# Patient Record
Sex: MALE | Race: BLACK OR AFRICAN AMERICAN | Employment: FULL TIME | URBAN - METROPOLITAN AREA
[De-identification: names, ages, dates, MRNs, and addresses within clinical notes are randomized per-mention and may not be internally consistent; named-entity substitution may affect disease eponyms.]

---

## 2019-08-21 ENCOUNTER — HOSPITAL ENCOUNTER (EMERGENCY)
Age: 49
Discharge: HOME | End: 2019-08-21
Attending: EMERGENCY MEDICINE
Payer: SELF-PAY

## 2019-08-21 ENCOUNTER — APPOINTMENT (OUTPATIENT)
Dept: GENERAL RADIOLOGY | Age: 49
End: 2019-08-21
Attending: EMERGENCY MEDICINE
Payer: SELF-PAY

## 2019-08-21 ENCOUNTER — APPOINTMENT (OUTPATIENT)
Dept: CT IMAGING | Age: 49
End: 2019-08-21
Attending: EMERGENCY MEDICINE
Payer: SELF-PAY

## 2019-08-21 VITALS
TEMPERATURE: 98.2 F | HEART RATE: 79 BPM | OXYGEN SATURATION: 99 % | HEIGHT: 73 IN | WEIGHT: 168 LBS | DIASTOLIC BLOOD PRESSURE: 81 MMHG | BODY MASS INDEX: 22.26 KG/M2 | RESPIRATION RATE: 18 BRPM | SYSTOLIC BLOOD PRESSURE: 134 MMHG

## 2019-08-21 DIAGNOSIS — T14.8XXA MUSCLE STRAIN: ICD-10-CM

## 2019-08-21 DIAGNOSIS — R51.9 NONINTRACTABLE HEADACHE, UNSPECIFIED CHRONICITY PATTERN, UNSPECIFIED HEADACHE TYPE: Primary | ICD-10-CM

## 2019-08-21 LAB
ALBUMIN SERPL-MCNC: 4 G/DL (ref 3.5–5)
ALBUMIN/GLOB SERPL: 1.1 {RATIO} (ref 1.2–3.5)
ALP SERPL-CCNC: 110 U/L (ref 50–136)
ALT SERPL-CCNC: 18 U/L (ref 12–65)
ANION GAP SERPL CALC-SCNC: 5 MMOL/L (ref 7–16)
AST SERPL-CCNC: 12 U/L (ref 15–37)
ATRIAL RATE: 88 BPM
BASOPHILS # BLD: 0 K/UL (ref 0–0.2)
BASOPHILS NFR BLD: 0 % (ref 0–2)
BILIRUB SERPL-MCNC: 0.2 MG/DL (ref 0.2–1.1)
BUN SERPL-MCNC: 7 MG/DL (ref 6–23)
CALCIUM SERPL-MCNC: 8.8 MG/DL (ref 8.3–10.4)
CALCULATED P AXIS, ECG09: 84 DEGREES
CALCULATED R AXIS, ECG10: 71 DEGREES
CALCULATED T AXIS, ECG11: 72 DEGREES
CHLORIDE SERPL-SCNC: 108 MMOL/L (ref 98–107)
CO2 SERPL-SCNC: 29 MMOL/L (ref 21–32)
CREAT SERPL-MCNC: 0.96 MG/DL (ref 0.8–1.5)
DIAGNOSIS, 93000: NORMAL
DIFFERENTIAL METHOD BLD: ABNORMAL
EOSINOPHIL # BLD: 0.2 K/UL (ref 0–0.8)
EOSINOPHIL NFR BLD: 1 % (ref 0.5–7.8)
ERYTHROCYTE [DISTWIDTH] IN BLOOD BY AUTOMATED COUNT: 13.2 % (ref 11.9–14.6)
GLOBULIN SER CALC-MCNC: 3.8 G/DL (ref 2.3–3.5)
GLUCOSE SERPL-MCNC: 108 MG/DL (ref 65–100)
HCT VFR BLD AUTO: 43.5 % (ref 41.1–50.3)
HGB BLD-MCNC: 14.3 G/DL (ref 13.6–17.2)
IMM GRANULOCYTES # BLD AUTO: 0 K/UL (ref 0–0.5)
IMM GRANULOCYTES NFR BLD AUTO: 0 % (ref 0–5)
LYMPHOCYTES # BLD: 3.8 K/UL (ref 0.5–4.6)
LYMPHOCYTES NFR BLD: 33 % (ref 13–44)
MCH RBC QN AUTO: 30.4 PG (ref 26.1–32.9)
MCHC RBC AUTO-ENTMCNC: 32.9 G/DL (ref 31.4–35)
MCV RBC AUTO: 92.6 FL (ref 79.6–97.8)
MONOCYTES # BLD: 0.7 K/UL (ref 0.1–1.3)
MONOCYTES NFR BLD: 6 % (ref 4–12)
NEUTS SEG # BLD: 6.8 K/UL (ref 1.7–8.2)
NEUTS SEG NFR BLD: 59 % (ref 43–78)
NRBC # BLD: 0 K/UL (ref 0–0.2)
P-R INTERVAL, ECG05: 134 MS
PLATELET # BLD AUTO: 298 K/UL (ref 150–450)
PMV BLD AUTO: 10.6 FL (ref 9.4–12.3)
POTASSIUM SERPL-SCNC: 3.5 MMOL/L (ref 3.5–5.1)
PROT SERPL-MCNC: 7.8 G/DL (ref 6.3–8.2)
Q-T INTERVAL, ECG07: 362 MS
QRS DURATION, ECG06: 76 MS
QTC CALCULATION (BEZET), ECG08: 438 MS
RBC # BLD AUTO: 4.7 M/UL (ref 4.23–5.6)
SODIUM SERPL-SCNC: 142 MMOL/L (ref 136–145)
TROPONIN I SERPL-MCNC: <0.02 NG/ML (ref 0.02–0.05)
VENTRICULAR RATE, ECG03: 88 BPM
WBC # BLD AUTO: 11.5 K/UL (ref 4.3–11.1)

## 2019-08-21 PROCEDURE — 85025 COMPLETE CBC W/AUTO DIFF WBC: CPT

## 2019-08-21 PROCEDURE — 99284 EMERGENCY DEPT VISIT MOD MDM: CPT | Performed by: EMERGENCY MEDICINE

## 2019-08-21 PROCEDURE — 96374 THER/PROPH/DIAG INJ IV PUSH: CPT | Performed by: EMERGENCY MEDICINE

## 2019-08-21 PROCEDURE — 74011000250 HC RX REV CODE- 250: Performed by: EMERGENCY MEDICINE

## 2019-08-21 PROCEDURE — 71046 X-RAY EXAM CHEST 2 VIEWS: CPT

## 2019-08-21 PROCEDURE — 74011250636 HC RX REV CODE- 250/636: Performed by: EMERGENCY MEDICINE

## 2019-08-21 PROCEDURE — 70450 CT HEAD/BRAIN W/O DYE: CPT

## 2019-08-21 PROCEDURE — 93005 ELECTROCARDIOGRAM TRACING: CPT | Performed by: EMERGENCY MEDICINE

## 2019-08-21 PROCEDURE — 72040 X-RAY EXAM NECK SPINE 2-3 VW: CPT

## 2019-08-21 PROCEDURE — 80053 COMPREHEN METABOLIC PANEL: CPT

## 2019-08-21 PROCEDURE — 84484 ASSAY OF TROPONIN QUANT: CPT

## 2019-08-21 RX ORDER — LIDOCAINE 50 MG/G
PATCH TOPICAL
Qty: 7 EACH | Refills: 0 | Status: SHIPPED | OUTPATIENT
Start: 2019-08-21

## 2019-08-21 RX ORDER — LIDOCAINE 4 G/100G
1 PATCH TOPICAL
Status: DISCONTINUED | OUTPATIENT
Start: 2019-08-21 | End: 2019-08-21 | Stop reason: HOSPADM

## 2019-08-21 RX ORDER — KETOROLAC TROMETHAMINE 30 MG/ML
15 INJECTION, SOLUTION INTRAMUSCULAR; INTRAVENOUS
Status: COMPLETED | OUTPATIENT
Start: 2019-08-21 | End: 2019-08-21

## 2019-08-21 RX ORDER — NAPROXEN 500 MG/1
500 TABLET ORAL 2 TIMES DAILY WITH MEALS
Qty: 20 TAB | Refills: 0 | Status: SHIPPED | OUTPATIENT
Start: 2019-08-21 | End: 2019-08-31

## 2019-08-21 RX ADMIN — KETOROLAC TROMETHAMINE 15 MG: 30 INJECTION, SOLUTION INTRAMUSCULAR at 16:30

## 2019-08-21 NOTE — DISCHARGE INSTRUCTIONS
Ice your neck and back -- 20-30 minutes 2-3 times a day for 2-3 days     Medications as directed     XR SPINE CERV PA LAT ODONT 3 V MAX   Final Result   IMPRESSION: No acute osseous abnormality. CT HEAD WO CONT   Final Result   IMPRESSION: No acute intracranial abnormality. CPT code 29676            XR CHEST PA LAT   Final Result   Impression:        No acute cardiopulmonary process.       CPT code(s) 44614

## 2019-08-21 NOTE — ED NOTES
I have reviewed discharge instructions with the patient and spouse. The patient and spouse verbalized understanding. Patient left ED via Discharge Method: ambulatory to Home with family    Opportunity for questions and clarification provided. Patient given 2 scripts. To continue your aftercare when you leave the hospital, you may receive an automated call from our care team to check in on how you are doing. This is a free service and part of our promise to provide the best care and service to meet your aftercare needs.  If you have questions, or wish to unsubscribe from this service please call [phone]. Thank you for Choosing our [facility] Emergency Department.
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
## 2020-05-14 NOTE — LETTER
[facility] EMERGENCY DEPT 
ONE  [address] [facility] 
[phone] Work/School Note Date: 8/21/2019 To Whom It May concern: 
 
Obdulia Hernandez was seen and treated today in the emergency room by the following provider(s): 
Attending Provider: Leopoldo Deep, MD.   
 
Obdulia Hernandez return to work on 8/24 Sincerely, 
 
 
 
 
Castillo Nicholson MD 
 
 
 
 Detail Level: Zone Detail Level: Detailed

## 2020-06-03 ENCOUNTER — HOSPITAL ENCOUNTER (EMERGENCY)
Age: 50
Discharge: HOME | End: 2020-06-03
Attending: EMERGENCY MEDICINE
Payer: COMMERCIAL

## 2020-06-03 VITALS
HEART RATE: 110 BPM | TEMPERATURE: 98.3 F | DIASTOLIC BLOOD PRESSURE: 78 MMHG | SYSTOLIC BLOOD PRESSURE: 117 MMHG | HEIGHT: 73 IN | BODY MASS INDEX: 22.13 KG/M2 | WEIGHT: 167 LBS | OXYGEN SATURATION: 98 % | RESPIRATION RATE: 18 BRPM

## 2020-06-03 DIAGNOSIS — T78.40XA ALLERGIC REACTION, INITIAL ENCOUNTER: Primary | ICD-10-CM

## 2020-06-03 PROCEDURE — 99283 EMERGENCY DEPT VISIT LOW MDM: CPT

## 2020-06-03 PROCEDURE — 74011636637 HC RX REV CODE- 636/637: Performed by: EMERGENCY MEDICINE

## 2020-06-03 PROCEDURE — 74011250637 HC RX REV CODE- 250/637: Performed by: EMERGENCY MEDICINE

## 2020-06-03 RX ORDER — PREDNISONE 20 MG/1
40 TABLET ORAL DAILY
Qty: 10 TAB | Refills: 0 | Status: SHIPPED | OUTPATIENT
Start: 2020-06-03 | End: 2020-06-08

## 2020-06-03 RX ORDER — DIPHENHYDRAMINE HCL 50 MG
50 CAPSULE ORAL
Status: COMPLETED | OUTPATIENT
Start: 2020-06-03 | End: 2020-06-03

## 2020-06-03 RX ADMIN — DIPHENHYDRAMINE HYDROCHLORIDE 50 MG: 50 CAPSULE ORAL at 22:19

## 2020-06-03 RX ADMIN — PREDNISONE 60 MG: 10 TABLET ORAL at 22:19

## 2020-06-03 NOTE — LETTER
[address] [facility] EMERGENCY DEPT 
[address] [facility] 
[phone] Work/School Note Date: 6/3/2020 To Whom It May concern: 
 
Hyacinth Oviedo was seen and treated today in the emergency room by the following provider(s): 
Attending Provider: Lore Jeong MD.   
 
Hyacinth Oviedo may return to work on 6/4/20.  
 
Sincerely, 
 
 
 
 
Bishop Mcnultych, RN

## 2020-06-04 NOTE — DISCHARGE INSTRUCTIONS
Rest.  Cool compresses. Elevate. No work tonight. Recheck tomorrow if not improving or if your situation worsens. Recheck sooner for fever or other worrisome symptoms. Patient Education        Allergic Reaction: Care Instructions  Your Care Instructions     An allergic reaction is an excessive response from your immune system to a medicine, chemical, food, insect bite, or other substance. A reaction can range from mild to life-threatening. Some people have a mild rash, hives, and itching or stomach cramps. In severe reactions, swelling of your tongue and throat can close up your airway so that you cannot breathe. Follow-up care is a key part of your treatment and safety. Be sure to make and go to all appointments, and call your doctor if you are having problems. It's also a good idea to know your test results and keep a list of the medicines you take. How can you care for yourself at home? · If you know what caused your allergic reaction, be sure to avoid it. Your allergy may become more severe each time you have a reaction. · Take an over-the-counter antihistamine, such as cetirizine (Zyrtec) or loratadine (Claritin), to treat mild symptoms. Read and follow directions on the label. Some antihistamines can make you feel sleepy. Do not give antihistamines to a child unless you have checked with your doctor first. Mild symptoms include sneezing or an itchy or runny nose; an itchy mouth; a few hives or mild itching; and mild nausea or stomach discomfort. · Do not scratch hives or a rash. Put a cold, moist towel on them or take cool baths to relieve itching. Put ice packs on hives, swelling, or insect stings for 10 to 15 minutes at a time. Put a thin cloth between the ice pack and your skin. Do not take hot baths or showers. They will make the itching worse. · Your doctor may prescribe a shot of epinephrine to carry with you in case you have a severe reaction.  Learn how to give yourself the shot and keep it with you at all times. Make sure it is not . · Go to the emergency room every time you have a severe reaction, even if you have used your shot of epinephrine and are feeling better. Symptoms can come back after a shot. · Wear medical alert jewelry that lists your allergies. You can buy this at most drugstores. · If your child has a severe allergy, make sure that his or her teachers, babysitters, coaches, and other caregivers know about the allergy. They should have an epinephrine shot, know how and when to give it, and have a plan to take your child to the hospital.  When should you call for help? Give an epinephrine shot if:  · You think you are having a severe allergic reaction. · You have symptoms in more than one body area, such as mild nausea and an itchy mouth. After giving an epinephrine shot call 911, even if you feel better. [id number] if:  · You have symptoms of a severe allergic reaction. These may include:  ? Sudden raised, red areas (hives) all over your body. ? Swelling of the throat, mouth, lips, or tongue. ? Trouble breathing. ? Passing out (losing consciousness). Or you may feel very lightheaded or suddenly feel weak, confused, or restless. · You have been given an epinephrine shot, even if you feel better. Call your doctor now or seek immediate medical care if:  · You have symptoms of an allergic reaction, such as:  ? A rash or hives (raised, red areas on the skin). ? Itching. ? Swelling. ? Belly pain, nausea, or vomiting. Watch closely for changes in your health, and be sure to contact your doctor if:  · You do not get better as expected. Where can you learn more? Go to http://sherman-jose.info/  Enter M957 in the search box to learn more about \"Allergic Reaction: Care Instructions. \"  Current as of: 2019               Content Version: 12.5  © 4597-0310 [employer].    Care instructions adapted under license by [employer] (which disclaims liability or warranty for this information). If you have questions about a medical condition or this instruction, always ask your healthcare professional. [address] any warranty or liability for your use of this information.

## 2020-06-04 NOTE — ED PROVIDER NOTES
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 Left work at 3 AM this morning. There were no particular injuries or any other incidents. Not around any chemicals. When he went to bed at 7 he noticed some slight itching he is right hand. As he slept during the day increasing itching and swelling of the hand. Slight burning sensation. But no overt pain. No fever chills. No recent trauma. Does not know of any insect or spider bites. Past medical history is negative. No history of gout    The history is provided by the patient. Hand Swelling    This is a new problem. The current episode started 6 to 12 hours ago. The problem occurs constantly. The problem has been gradually worsening. The patient is experiencing no pain. Associated symptoms include stiffness, tingling and itching. Pertinent negatives include no numbness. He has tried nothing for the symptoms. There has been no history of extremity trauma.         Past Medical History:   Diagnosis Date    Gonococcal urethritis 4/2016; 6/2016    HSV-2 seropositive     Tobacco abuse        Past Surgical History:   Procedure Laterality Date    HX ORTHOPAEDIC      right index finger         Family History:   Problem Relation Age of Onset    Cancer Mother         brain tumor    Cancer Father         uncertain    Cancer Brother         brain tumor, lung       Social History     Socioeconomic History    Marital status: SINGLE     Spouse name: Not on file    Number of children: Not on file    Years of education: Not on file    Highest education level: Not on file   Occupational History    Not on file   Social Needs    Financial resource strain: Not on file    Food insecurity     Worry: Not on file     Inability: Not on file    Transportation needs     Medical: Not on file     Non-medical: Not on file   Tobacco Use    Smoking status: Current Every Day Smoker     Packs/day: 0.50     Types: Cigarettes     Start date: 4/5/1983    Smokeless tobacco: Never Used Substance and Sexual Activity    Alcohol use: Yes     Alcohol/week: 0.0 standard drinks     Comment: occ    Drug use: No    Sexual activity: Not on file   Lifestyle    Physical activity     Days per week: Not on file     Minutes per session: Not on file    Stress: Not on file   Relationships    Social connections     Talks on phone: Not on file     Gets together: Not on file     Attends Protestant service: Not on file     Active member of club or organization: Not on file     Attends meetings of clubs or organizations: Not on file     Relationship status: Not on file    Intimate partner violence     Fear of current or ex partner: Not on file     Emotionally abused: Not on file     Physically abused: Not on file     Forced sexual activity: Not on file   Other Topics Concern    Not on file   Social History Narrative    Not on file         ALLERGIES: Bee venom protein (honey bee)    Review of Systems   Constitutional: Negative for chills and fever. Musculoskeletal: Positive for stiffness. Skin: Positive for itching. Neurological: Positive for tingling. Negative for weakness and numbness. Vitals:    06/03/20 2156   BP: 118/83   Pulse: (!) 110   Resp: 18   Temp: 98.3 °F (36.8 °C)   SpO2: 98%   Weight: 75.8 kg (167 lb)   Height: 6' 1\" (1.854 m)            Physical Exam  Vitals signs and nursing note reviewed. Constitutional:       Appearance: He is not ill-appearing. Musculoskeletal:        Hands:    Skin:     General: Skin is warm and dry. Findings: No erythema or rash. Neurological:      Mental Status: He is alert. MDM  Number of Diagnoses or Management Options  Diagnosis management comments: Suspect a local reaction to perhaps an insect sting or other contact dermatitis. No evidence for cellulitis. Doubt gout.     Risk of Complications, Morbidity, and/or Mortality  Presenting problems: low  Diagnostic procedures: minimal  Management options: low    Patient Progress  Patient progress: stable         Procedures

## 2020-06-04 NOTE — ED NOTES
I have reviewed discharge instructions with the patient. The patient verbalized understanding. Patient left ED via Discharge Method: ambulatory to Home with self. Opportunity for questions and clarification provided. Patient given 1 scripts. To continue your aftercare when you leave the hospital, you may receive an automated call from our care team to check in on how you are doing. This is a free service and part of our promise to provide the best care and service to meet your aftercare needs.  If you have questions, or wish to unsubscribe from this service please call [phone]. Thank you for Choosing our [facility] Emergency Department.

## 2020-06-04 NOTE — ED TRIAGE NOTES
Pt ambulatory to triage wearing a mask. Pt c/o right hand swelling x 1 day. 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 Pt reports, \"Everything was fine when I got off work last night. I woke up today and my thumb was swollen and itching. I thought maybe I got bitten by a bug, but I don't see a bite anywhere. Throughout the day, the swelling has gone from my thumb into my hand and up my forearm. \" Pt denies any injury at work. Pt denies working with any chemicals. Pt denies any changes in soaps or detergents. Pt denies any Hx of gout. Pt has redness and swelling to top of hand from thumb to fourth finger. Pt also has redness and swelling up inside of wrist to forearm. Pt reports difficulty bending any of his fingers on the right hand except the pinkie finger. No obvious bites, cuts or injuries noted.
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
